# Patient Record
Sex: FEMALE | URBAN - NONMETROPOLITAN AREA
[De-identification: names, ages, dates, MRNs, and addresses within clinical notes are randomized per-mention and may not be internally consistent; named-entity substitution may affect disease eponyms.]

---

## 2018-06-28 ENCOUNTER — APPOINTMENT (RX ONLY)
Dept: URBAN - NONMETROPOLITAN AREA CLINIC 1 | Facility: CLINIC | Age: 83
Setting detail: DERMATOLOGY
End: 2018-06-28

## 2018-06-28 DIAGNOSIS — D22 MELANOCYTIC NEVI: ICD-10-CM

## 2018-06-28 DIAGNOSIS — Z85.828 PERSONAL HISTORY OF OTHER MALIGNANT NEOPLASM OF SKIN: ICD-10-CM

## 2018-06-28 DIAGNOSIS — L82.1 OTHER SEBORRHEIC KERATOSIS: ICD-10-CM

## 2018-06-28 PROBLEM — D22.5 MELANOCYTIC NEVI OF TRUNK: Status: ACTIVE | Noted: 2018-06-28

## 2018-06-28 PROBLEM — D22.71 MELANOCYTIC NEVI OF RIGHT LOWER LIMB, INCLUDING HIP: Status: ACTIVE | Noted: 2018-06-28

## 2018-06-28 PROBLEM — Z92.3 PERSONAL HISTORY OF IRRADIATION: Status: ACTIVE | Noted: 2018-06-28

## 2018-06-28 PROCEDURE — ? COUNSELING

## 2018-06-28 PROCEDURE — ? OBSERVATION

## 2018-06-28 PROCEDURE — 99213 OFFICE O/P EST LOW 20 MIN: CPT

## 2018-06-28 ASSESSMENT — LOCATION ZONE DERM
LOCATION ZONE: TOE
LOCATION ZONE: TRUNK

## 2018-06-28 ASSESSMENT — LOCATION SIMPLE DESCRIPTION DERM
LOCATION SIMPLE: UPPER BACK
LOCATION SIMPLE: CHEST
LOCATION SIMPLE: RIGHT 3RD TOE

## 2018-06-28 ASSESSMENT — LOCATION DETAILED DESCRIPTION DERM
LOCATION DETAILED: MIDDLE STERNUM
LOCATION DETAILED: RIGHT MEDIAL 3RD TOE
LOCATION DETAILED: INFERIOR THORACIC SPINE

## 2018-06-28 NOTE — PROCEDURE: OBSERVATION
Detail Level: Detailed
Morphology Per Location (Optional): even brown macule
X Size Of Lesion In Cm (Optional): 0.2

## 2019-06-23 ENCOUNTER — HOSPITAL ENCOUNTER (OUTPATIENT)
Dept: RADIOLOGY | Facility: MEDICAL CENTER | Age: 84
End: 2019-06-23

## 2019-06-23 ENCOUNTER — APPOINTMENT (OUTPATIENT)
Dept: RADIOLOGY | Facility: MEDICAL CENTER | Age: 84
DRG: 641 | End: 2019-06-23
Attending: EMERGENCY MEDICINE
Payer: MEDICARE

## 2019-06-23 ENCOUNTER — APPOINTMENT (OUTPATIENT)
Dept: RADIOLOGY | Facility: MEDICAL CENTER | Age: 84
DRG: 641 | End: 2019-06-23
Attending: SURGERY
Payer: MEDICARE

## 2019-06-23 ENCOUNTER — HOSPITAL ENCOUNTER (INPATIENT)
Facility: MEDICAL CENTER | Age: 84
LOS: 1 days | DRG: 641 | End: 2019-06-24
Attending: EMERGENCY MEDICINE | Admitting: FAMILY MEDICINE
Payer: MEDICARE

## 2019-06-23 DIAGNOSIS — E87.6 HYPOKALEMIA: ICD-10-CM

## 2019-06-23 DIAGNOSIS — E87.5 HYPERKALEMIA: ICD-10-CM

## 2019-06-23 DIAGNOSIS — K52.0 RADIATION ENTEROCOLITIS: ICD-10-CM

## 2019-06-23 DIAGNOSIS — G89.29 OTHER CHRONIC PAIN: ICD-10-CM

## 2019-06-23 DIAGNOSIS — R19.8 PERFORATED VISCUS: ICD-10-CM

## 2019-06-23 DIAGNOSIS — R10.9 ABDOMINAL PAIN, UNSPECIFIED ABDOMINAL LOCATION: ICD-10-CM

## 2019-06-23 DIAGNOSIS — N17.9 AKI (ACUTE KIDNEY INJURY) (HCC): ICD-10-CM

## 2019-06-23 PROBLEM — E03.9 HYPOTHYROID: Status: ACTIVE | Noted: 2019-06-23

## 2019-06-23 PROBLEM — Z78.9 ON TOTAL PARENTERAL NUTRITION (TPN): Status: ACTIVE | Noted: 2019-06-23

## 2019-06-23 PROBLEM — K66.8 PNEUMOPERITONEUM: Status: ACTIVE | Noted: 2019-06-23

## 2019-06-23 PROBLEM — I10 ESSENTIAL HYPERTENSION: Status: ACTIVE | Noted: 2019-06-23

## 2019-06-23 PROBLEM — D75.89 MACROCYTOSIS: Status: ACTIVE | Noted: 2019-06-23

## 2019-06-23 PROBLEM — R79.89 ELEVATED LACTIC ACID LEVEL: Status: ACTIVE | Noted: 2019-06-23

## 2019-06-23 LAB
ALBUMIN SERPL BCP-MCNC: 3.1 G/DL (ref 3.2–4.9)
ALBUMIN/GLOB SERPL: 1.1 G/DL
ALP SERPL-CCNC: 131 U/L (ref 30–99)
ALT SERPL-CCNC: 29 U/L (ref 2–50)
ANION GAP SERPL CALC-SCNC: 10 MMOL/L (ref 0–11.9)
APPEARANCE UR: ABNORMAL
APTT PPP: 38.7 SEC (ref 24.7–36)
AST SERPL-CCNC: 18 U/L (ref 12–45)
BACTERIA #/AREA URNS HPF: ABNORMAL /HPF
BASOPHILS # BLD AUTO: 0 % (ref 0–1.8)
BASOPHILS # BLD: 0 K/UL (ref 0–0.12)
BILIRUB SERPL-MCNC: 0.4 MG/DL (ref 0.1–1.5)
BILIRUB UR QL STRIP.AUTO: NEGATIVE
BUN SERPL-MCNC: 60 MG/DL (ref 8–22)
CALCIUM SERPL-MCNC: 9.3 MG/DL (ref 8.5–10.5)
CHLORIDE SERPL-SCNC: 103 MMOL/L (ref 96–112)
CK SERPL-CCNC: 20 U/L (ref 0–154)
CO2 SERPL-SCNC: 20 MMOL/L (ref 20–33)
COLOR UR: ABNORMAL
CREAT SERPL-MCNC: 1.29 MG/DL (ref 0.5–1.4)
EKG IMPRESSION: NORMAL
EKG IMPRESSION: NORMAL
EOSINOPHIL # BLD AUTO: 0 K/UL (ref 0–0.51)
EOSINOPHIL NFR BLD: 0 % (ref 0–6.9)
ERYTHROCYTE [DISTWIDTH] IN BLOOD BY AUTOMATED COUNT: 59 FL (ref 35.9–50)
GLOBULIN SER CALC-MCNC: 2.9 G/DL (ref 1.9–3.5)
GLUCOSE SERPL-MCNC: 97 MG/DL (ref 65–99)
GLUCOSE UR STRIP.AUTO-MCNC: NEGATIVE MG/DL
HAV IGM SERPL QL IA: NEGATIVE
HBV CORE IGM SER QL: NEGATIVE
HBV SURFACE AB SERPL IA-ACNC: 3.4 MIU/ML (ref 0–10)
HBV SURFACE AG SER QL: NEGATIVE
HCT VFR BLD AUTO: 37.7 % (ref 37–47)
HCV AB SER QL: NEGATIVE
HGB BLD-MCNC: 11.9 G/DL (ref 12–16)
INR PPP: 1.19 (ref 0.87–1.13)
KETONES UR STRIP.AUTO-MCNC: NEGATIVE MG/DL
LACTATE BLD-SCNC: 2.8 MMOL/L (ref 0.5–2)
LDH SERPL L TO P-CCNC: 115 U/L (ref 107–266)
LEUKOCYTE ESTERASE UR QL STRIP.AUTO: ABNORMAL
LIPASE SERPL-CCNC: 75 U/L (ref 11–82)
LYMPHOCYTES # BLD AUTO: 0.48 K/UL (ref 1–4.8)
LYMPHOCYTES NFR BLD: 5 % (ref 22–41)
MANUAL DIFF BLD: NORMAL
MCH RBC QN AUTO: 31.6 PG (ref 27–33)
MCHC RBC AUTO-ENTMCNC: 31.6 G/DL (ref 33.6–35)
MCV RBC AUTO: 100 FL (ref 81.4–97.8)
MICRO URNS: ABNORMAL
MONOCYTES # BLD AUTO: 0.77 K/UL (ref 0–0.85)
MONOCYTES NFR BLD AUTO: 8 % (ref 0–13.4)
MORPHOLOGY BLD-IMP: NORMAL
NEUTROPHILS # BLD AUTO: 8.35 K/UL (ref 2–7.15)
NEUTROPHILS NFR BLD: 79 % (ref 44–72)
NEUTS BAND NFR BLD MANUAL: 8 % (ref 0–10)
NITRITE UR QL STRIP.AUTO: POSITIVE
NRBC # BLD AUTO: 0 K/UL
NRBC BLD-RTO: 0 /100 WBC
PH UR STRIP.AUTO: 5 [PH]
PLATELET # BLD AUTO: 420 K/UL (ref 164–446)
PMV BLD AUTO: 9.9 FL (ref 9–12.9)
POTASSIUM SERPL-SCNC: 6.4 MMOL/L (ref 3.6–5.5)
POTASSIUM SERPL-SCNC: 7.5 MMOL/L (ref 3.6–5.5)
PROT SERPL-MCNC: 6 G/DL (ref 6–8.2)
PROT UR QL STRIP: NEGATIVE MG/DL
PROTHROMBIN TIME: 15.4 SEC (ref 12–14.6)
RBC # BLD AUTO: 3.77 M/UL (ref 4.2–5.4)
RBC # URNS HPF: ABNORMAL /HPF
RBC UR QL AUTO: ABNORMAL
SODIUM SERPL-SCNC: 133 MMOL/L (ref 135–145)
SP GR UR STRIP.AUTO: 1.02
UROBILINOGEN UR STRIP.AUTO-MCNC: 0.2 MG/DL
WBC # BLD AUTO: 9.6 K/UL (ref 4.8–10.8)
WBC #/AREA URNS HPF: ABNORMAL /HPF

## 2019-06-23 PROCEDURE — 700111 HCHG RX REV CODE 636 W/ 250 OVERRIDE (IP): Performed by: FAMILY MEDICINE

## 2019-06-23 PROCEDURE — 85610 PROTHROMBIN TIME: CPT

## 2019-06-23 PROCEDURE — 96366 THER/PROPH/DIAG IV INF ADDON: CPT

## 2019-06-23 PROCEDURE — 84132 ASSAY OF SERUM POTASSIUM: CPT

## 2019-06-23 PROCEDURE — 700111 HCHG RX REV CODE 636 W/ 250 OVERRIDE (IP): Performed by: EMERGENCY MEDICINE

## 2019-06-23 PROCEDURE — 85007 BL SMEAR W/DIFF WBC COUNT: CPT

## 2019-06-23 PROCEDURE — 99232 SBSQ HOSP IP/OBS MODERATE 35: CPT | Performed by: INTERNAL MEDICINE

## 2019-06-23 PROCEDURE — 36556 INSERT NON-TUNNEL CV CATH: CPT

## 2019-06-23 PROCEDURE — 87086 URINE CULTURE/COLONY COUNT: CPT

## 2019-06-23 PROCEDURE — 700105 HCHG RX REV CODE 258: Performed by: FAMILY MEDICINE

## 2019-06-23 PROCEDURE — 700101 HCHG RX REV CODE 250: Performed by: EMERGENCY MEDICINE

## 2019-06-23 PROCEDURE — 93005 ELECTROCARDIOGRAM TRACING: CPT | Performed by: EMERGENCY MEDICINE

## 2019-06-23 PROCEDURE — 80053 COMPREHEN METABOLIC PANEL: CPT

## 2019-06-23 PROCEDURE — 81001 URINALYSIS AUTO W/SCOPE: CPT

## 2019-06-23 PROCEDURE — 71045 X-RAY EXAM CHEST 1 VIEW: CPT

## 2019-06-23 PROCEDURE — 83690 ASSAY OF LIPASE: CPT

## 2019-06-23 PROCEDURE — 87186 SC STD MICRODIL/AGAR DIL: CPT

## 2019-06-23 PROCEDURE — 87077 CULTURE AEROBIC IDENTIFY: CPT

## 2019-06-23 PROCEDURE — 304538 HCHG NG TUBE

## 2019-06-23 PROCEDURE — 96365 THER/PROPH/DIAG IV INF INIT: CPT

## 2019-06-23 PROCEDURE — 99291 CRITICAL CARE FIRST HOUR: CPT | Performed by: FAMILY MEDICINE

## 2019-06-23 PROCEDURE — 86706 HEP B SURFACE ANTIBODY: CPT

## 2019-06-23 PROCEDURE — 02HV33Z INSERTION OF INFUSION DEVICE INTO SUPERIOR VENA CAVA, PERCUTANEOUS APPROACH: ICD-10-PCS | Performed by: SURGERY

## 2019-06-23 PROCEDURE — 5A1D70Z PERFORMANCE OF URINARY FILTRATION, INTERMITTENT, LESS THAN 6 HOURS PER DAY: ICD-10-PCS | Performed by: INTERNAL MEDICINE

## 2019-06-23 PROCEDURE — 80074 ACUTE HEPATITIS PANEL: CPT

## 2019-06-23 PROCEDURE — 99222 1ST HOSP IP/OBS MODERATE 55: CPT | Performed by: INTERNAL MEDICINE

## 2019-06-23 PROCEDURE — 90935 HEMODIALYSIS ONE EVALUATION: CPT

## 2019-06-23 PROCEDURE — 85027 COMPLETE CBC AUTOMATED: CPT

## 2019-06-23 PROCEDURE — 700105 HCHG RX REV CODE 258: Performed by: EMERGENCY MEDICINE

## 2019-06-23 PROCEDURE — 700111 HCHG RX REV CODE 636 W/ 250 OVERRIDE (IP): Performed by: INTERNAL MEDICINE

## 2019-06-23 PROCEDURE — 99291 CRITICAL CARE FIRST HOUR: CPT

## 2019-06-23 PROCEDURE — C1751 CATH, INF, PER/CENT/MIDLINE: HCPCS

## 2019-06-23 PROCEDURE — 700102 HCHG RX REV CODE 250 W/ 637 OVERRIDE(OP): Performed by: EMERGENCY MEDICINE

## 2019-06-23 PROCEDURE — 94640 AIRWAY INHALATION TREATMENT: CPT

## 2019-06-23 PROCEDURE — C1751 CATH, INF, PER/CENT/MIDLINE: HCPCS | Performed by: SURGERY

## 2019-06-23 PROCEDURE — 36415 COLL VENOUS BLD VENIPUNCTURE: CPT

## 2019-06-23 PROCEDURE — 82550 ASSAY OF CK (CPK): CPT

## 2019-06-23 PROCEDURE — 85730 THROMBOPLASTIN TIME PARTIAL: CPT

## 2019-06-23 PROCEDURE — 770004 HCHG ROOM/CARE - ONCOLOGY PRIVATE *

## 2019-06-23 PROCEDURE — 83615 LACTATE (LD) (LDH) ENZYME: CPT

## 2019-06-23 PROCEDURE — 96375 TX/PRO/DX INJ NEW DRUG ADDON: CPT

## 2019-06-23 PROCEDURE — 83605 ASSAY OF LACTIC ACID: CPT

## 2019-06-23 RX ORDER — SODIUM CHLORIDE 9 MG/ML
1000 INJECTION, SOLUTION INTRAVENOUS
Status: DISCONTINUED | OUTPATIENT
Start: 2019-06-23 | End: 2019-06-23

## 2019-06-23 RX ORDER — SODIUM CHLORIDE 9 MG/ML
30 INJECTION, SOLUTION INTRAVENOUS
Status: DISCONTINUED | OUTPATIENT
Start: 2019-06-23 | End: 2019-06-23

## 2019-06-23 RX ORDER — HYDROMORPHONE HYDROCHLORIDE 1 MG/ML
0.5 INJECTION, SOLUTION INTRAMUSCULAR; INTRAVENOUS; SUBCUTANEOUS ONCE
Status: COMPLETED | OUTPATIENT
Start: 2019-06-23 | End: 2019-06-23

## 2019-06-23 RX ORDER — SODIUM POLYSTYRENE SULFONATE 15 G/60ML
30 SUSPENSION ORAL; RECTAL ONCE
Status: DISCONTINUED | OUTPATIENT
Start: 2019-06-23 | End: 2019-06-23

## 2019-06-23 RX ORDER — BUPRENORPHINE AND NALOXONE 2; .5 MG/1; MG/1
1 FILM, SOLUBLE BUCCAL; SUBLINGUAL
COMMUNITY
Start: 2018-04-19

## 2019-06-23 RX ORDER — POLYETHYLENE GLYCOL 3350 17 G/17G
1 POWDER, FOR SOLUTION ORAL
Status: DISCONTINUED | OUTPATIENT
Start: 2019-06-23 | End: 2019-06-23

## 2019-06-23 RX ORDER — DIPHENOXYLATE HYDROCHLORIDE AND ATROPINE SULFATE 2.5; .025 MG/1; MG/1
0.5 TABLET ORAL 4 TIMES DAILY PRN
COMMUNITY
Start: 2014-11-25

## 2019-06-23 RX ORDER — SODIUM CHLORIDE 9 MG/ML
250 INJECTION, SOLUTION INTRAVENOUS
Status: DISCONTINUED | OUTPATIENT
Start: 2019-06-23 | End: 2019-06-23

## 2019-06-23 RX ORDER — HYDROCODONE BITARTRATE AND ACETAMINOPHEN 5; 325 MG/1; MG/1
1 TABLET ORAL EVERY 6 HOURS PRN
COMMUNITY
Start: 2017-09-25

## 2019-06-23 RX ORDER — HYDROMORPHONE HYDROCHLORIDE 1 MG/ML
0.5 INJECTION, SOLUTION INTRAMUSCULAR; INTRAVENOUS; SUBCUTANEOUS
Status: DISCONTINUED | OUTPATIENT
Start: 2019-06-23 | End: 2019-06-23

## 2019-06-23 RX ORDER — CIPROFLOXACIN 500 MG/1
250 TABLET, FILM COATED ORAL
Status: DISCONTINUED | OUTPATIENT
Start: 2019-06-23 | End: 2019-06-24 | Stop reason: HOSPADM

## 2019-06-23 RX ORDER — SPIRONOLACTONE 25 MG/1
25 TABLET ORAL DAILY
COMMUNITY
Start: 2018-04-19

## 2019-06-23 RX ORDER — LABETALOL HYDROCHLORIDE 5 MG/ML
10 INJECTION, SOLUTION INTRAVENOUS EVERY 4 HOURS PRN
Status: DISCONTINUED | OUTPATIENT
Start: 2019-06-23 | End: 2019-06-23

## 2019-06-23 RX ORDER — ALBUMIN (HUMAN) 12.5 G/50ML
12.5 SOLUTION INTRAVENOUS
Status: DISCONTINUED | OUTPATIENT
Start: 2019-06-23 | End: 2019-06-23

## 2019-06-23 RX ORDER — ONDANSETRON 2 MG/ML
8 INJECTION INTRAMUSCULAR; INTRAVENOUS EVERY 8 HOURS PRN
Status: DISCONTINUED | OUTPATIENT
Start: 2019-06-23 | End: 2019-06-24 | Stop reason: HOSPADM

## 2019-06-23 RX ORDER — SODIUM CHLORIDE 9 MG/ML
1000 INJECTION, SOLUTION INTRAVENOUS ONCE
Status: COMPLETED | OUTPATIENT
Start: 2019-06-23 | End: 2019-06-23

## 2019-06-23 RX ORDER — BISACODYL 10 MG
10 SUPPOSITORY, RECTAL RECTAL
Status: DISCONTINUED | OUTPATIENT
Start: 2019-06-23 | End: 2019-06-23

## 2019-06-23 RX ORDER — LORAZEPAM 2 MG/ML
1 CONCENTRATE ORAL
Status: DISCONTINUED | OUTPATIENT
Start: 2019-06-23 | End: 2019-06-24 | Stop reason: HOSPADM

## 2019-06-23 RX ORDER — LORAZEPAM 2 MG/ML
1 INJECTION INTRAMUSCULAR
Status: DISCONTINUED | OUTPATIENT
Start: 2019-06-23 | End: 2019-06-24 | Stop reason: HOSPADM

## 2019-06-23 RX ORDER — SODIUM CHLORIDE, SODIUM LACTATE, POTASSIUM CHLORIDE, AND CALCIUM CHLORIDE .6; .31; .03; .02 G/100ML; G/100ML; G/100ML; G/100ML
1000 INJECTION, SOLUTION INTRAVENOUS ONCE
Status: DISCONTINUED | OUTPATIENT
Start: 2019-06-23 | End: 2019-06-23

## 2019-06-23 RX ORDER — ONDANSETRON 2 MG/ML
4 INJECTION INTRAMUSCULAR; INTRAVENOUS ONCE
Status: COMPLETED | OUTPATIENT
Start: 2019-06-23 | End: 2019-06-23

## 2019-06-23 RX ORDER — LOPERAMIDE HYDROCHLORIDE 2 MG/1
0.5 TABLET ORAL 4 TIMES DAILY PRN
Status: ON HOLD | COMMUNITY
Start: 2012-05-17 | End: 2019-06-24

## 2019-06-23 RX ORDER — ACETAMINOPHEN 325 MG/1
650 TABLET ORAL EVERY 6 HOURS PRN
Status: DISCONTINUED | OUTPATIENT
Start: 2019-06-23 | End: 2019-06-23

## 2019-06-23 RX ORDER — MORPHINE SULFATE 10 MG/ML
5 INJECTION, SOLUTION INTRAMUSCULAR; INTRAVENOUS
Status: DISCONTINUED | OUTPATIENT
Start: 2019-06-23 | End: 2019-06-24 | Stop reason: HOSPADM

## 2019-06-23 RX ORDER — ONDANSETRON 4 MG/1
4 TABLET, ORALLY DISINTEGRATING ORAL EVERY 4 HOURS PRN
Status: DISCONTINUED | OUTPATIENT
Start: 2019-06-23 | End: 2019-06-23

## 2019-06-23 RX ORDER — HEPARIN SODIUM 1000 [USP'U]/ML
3000 INJECTION, SOLUTION INTRAVENOUS; SUBCUTANEOUS
Status: DISCONTINUED | OUTPATIENT
Start: 2019-06-23 | End: 2019-06-23

## 2019-06-23 RX ORDER — LEVOTHYROXINE SODIUM 0.1 MG/1
100 TABLET ORAL DAILY
COMMUNITY
Start: 2010-11-12

## 2019-06-23 RX ORDER — ATROPINE SULFATE 10 MG/ML
2 SOLUTION/ DROPS OPHTHALMIC EVERY 4 HOURS PRN
Status: DISCONTINUED | OUTPATIENT
Start: 2019-06-23 | End: 2019-06-24 | Stop reason: HOSPADM

## 2019-06-23 RX ORDER — LOSARTAN POTASSIUM 25 MG/1
25 TABLET ORAL DAILY
Status: ON HOLD | COMMUNITY
Start: 2018-11-13 | End: 2019-06-24

## 2019-06-23 RX ORDER — OXYCODONE HYDROCHLORIDE 5 MG/1
5 TABLET ORAL
Status: DISCONTINUED | OUTPATIENT
Start: 2019-06-23 | End: 2019-06-23

## 2019-06-23 RX ORDER — ONDANSETRON 2 MG/ML
4 INJECTION INTRAMUSCULAR; INTRAVENOUS EVERY 4 HOURS PRN
Status: DISCONTINUED | OUTPATIENT
Start: 2019-06-23 | End: 2019-06-23

## 2019-06-23 RX ORDER — AMOXICILLIN 250 MG
2 CAPSULE ORAL 2 TIMES DAILY
Status: DISCONTINUED | OUTPATIENT
Start: 2019-06-23 | End: 2019-06-23

## 2019-06-23 RX ORDER — ONDANSETRON 8 MG/1
8 TABLET, ORALLY DISINTEGRATING ORAL EVERY 8 HOURS PRN
Status: DISCONTINUED | OUTPATIENT
Start: 2019-06-23 | End: 2019-06-24 | Stop reason: HOSPADM

## 2019-06-23 RX ORDER — OXYCODONE HYDROCHLORIDE 10 MG/1
10 TABLET ORAL
Status: DISCONTINUED | OUTPATIENT
Start: 2019-06-23 | End: 2019-06-23

## 2019-06-23 RX ORDER — MORPHINE SULFATE 10 MG/ML
10 INJECTION, SOLUTION INTRAMUSCULAR; INTRAVENOUS
Status: DISCONTINUED | OUTPATIENT
Start: 2019-06-23 | End: 2019-06-24 | Stop reason: HOSPADM

## 2019-06-23 RX ORDER — SODIUM CHLORIDE 9 MG/ML
INJECTION, SOLUTION INTRAVENOUS CONTINUOUS
Status: DISCONTINUED | OUTPATIENT
Start: 2019-06-23 | End: 2019-06-23

## 2019-06-23 RX ORDER — HEPARIN SODIUM 5000 [USP'U]/ML
5000 INJECTION, SOLUTION INTRAVENOUS; SUBCUTANEOUS EVERY 8 HOURS
Status: DISCONTINUED | OUTPATIENT
Start: 2019-06-23 | End: 2019-06-23

## 2019-06-23 RX ADMIN — HYDROMORPHONE HYDROCHLORIDE 0.5 MG: 1 INJECTION, SOLUTION INTRAMUSCULAR; INTRAVENOUS; SUBCUTANEOUS at 13:21

## 2019-06-23 RX ADMIN — MORPHINE SULFATE 5 MG: 10 INJECTION INTRAVENOUS at 17:56

## 2019-06-23 RX ADMIN — ALBUTEROL SULFATE 10 MG: 5 SOLUTION RESPIRATORY (INHALATION) at 07:47

## 2019-06-23 RX ADMIN — SODIUM CHLORIDE: 9 INJECTION, SOLUTION INTRAVENOUS at 12:12

## 2019-06-23 RX ADMIN — DEXTROSE MONOHYDRATE 250 ML: 100 INJECTION, SOLUTION INTRAVENOUS at 08:18

## 2019-06-23 RX ADMIN — HYDROMORPHONE HYDROCHLORIDE 0.25 MG: 1 INJECTION, SOLUTION INTRAMUSCULAR; INTRAVENOUS; SUBCUTANEOUS at 08:36

## 2019-06-23 RX ADMIN — ONDANSETRON 4 MG: 2 INJECTION INTRAMUSCULAR; INTRAVENOUS at 07:38

## 2019-06-23 RX ADMIN — MORPHINE SULFATE 5 MG: 10 INJECTION INTRAVENOUS at 20:48

## 2019-06-23 RX ADMIN — SODIUM CHLORIDE 1000 ML: 9 INJECTION, SOLUTION INTRAVENOUS at 09:32

## 2019-06-23 RX ADMIN — SODIUM BICARBONATE 50 MEQ: 84 INJECTION, SOLUTION INTRAVENOUS at 09:34

## 2019-06-23 RX ADMIN — INSULIN HUMAN 10 UNITS: 100 INJECTION, SOLUTION PARENTERAL at 08:25

## 2019-06-23 RX ADMIN — MORPHINE SULFATE 5 MG: 10 INJECTION INTRAVENOUS at 18:44

## 2019-06-23 RX ADMIN — MORPHINE SULFATE 5 MG: 10 INJECTION INTRAVENOUS at 15:54

## 2019-06-23 RX ADMIN — CALCIUM GLUCONATE 1000 MG: 98 INJECTION, SOLUTION INTRAVENOUS at 09:04

## 2019-06-23 ASSESSMENT — COGNITIVE AND FUNCTIONAL STATUS - GENERAL
TURNING FROM BACK TO SIDE WHILE IN FLAT BAD: A LITTLE
HELP NEEDED FOR BATHING: A LITTLE
PERSONAL GROOMING: A LITTLE
CLIMB 3 TO 5 STEPS WITH RAILING: TOTAL
WALKING IN HOSPITAL ROOM: A LOT
SUGGESTED CMS G CODE MODIFIER DAILY ACTIVITY: CK
STANDING UP FROM CHAIR USING ARMS: A LOT
MOVING FROM LYING ON BACK TO SITTING ON SIDE OF FLAT BED: A LOT
DAILY ACTIVITIY SCORE: 18
DRESSING REGULAR UPPER BODY CLOTHING: A LITTLE
TOILETING: A LITTLE
MOBILITY SCORE: 13
MOVING TO AND FROM BED TO CHAIR: A LITTLE
DRESSING REGULAR LOWER BODY CLOTHING: A LOT
SUGGESTED CMS G CODE MODIFIER MOBILITY: CL

## 2019-06-23 ASSESSMENT — ENCOUNTER SYMPTOMS
SORE THROAT: 0
NERVOUS/ANXIOUS: 0
BLOOD IN STOOL: 0
BLURRED VISION: 0
HEADACHES: 0
DIARRHEA: 0
NAUSEA: 1
NAUSEA: 0
WEAKNESS: 0
FEVER: 0
SHORTNESS OF BREATH: 0
COUGH: 0
BACK PAIN: 0
NECK PAIN: 0
VOMITING: 1
CHILLS: 0
ABDOMINAL PAIN: 1
WHEEZING: 0
DIZZINESS: 0
HEARTBURN: 0

## 2019-06-23 ASSESSMENT — PATIENT HEALTH QUESTIONNAIRE - PHQ9
SUM OF ALL RESPONSES TO PHQ9 QUESTIONS 1 AND 2: 0
1. LITTLE INTEREST OR PLEASURE IN DOING THINGS: NOT AT ALL
2. FEELING DOWN, DEPRESSED, IRRITABLE, OR HOPELESS: NOT AT ALL

## 2019-06-23 ASSESSMENT — LIFESTYLE VARIABLES
DO YOU DRINK ALCOHOL: NO
EVER_SMOKED: NEVER

## 2019-06-23 ASSESSMENT — PAIN DESCRIPTION - DESCRIPTORS: DESCRIPTORS: ACHING

## 2019-06-23 NOTE — ED NOTES
Report received from RN. Assumed care of pt. ED tech completed EKG. Pt denies needs at this time. Call light within reach.

## 2019-06-23 NOTE — ED PROVIDER NOTES
ED Provider Note    Scribed for Fidel Gaona M.D. by Ayad Prajapati. 6/23/2019, 7:23 AM.    Primary care provider: No primary care provider noted.  Means of arrival: Ambulance  History obtained from: Patient  History limited by: None     CHIEF COMPLAINT  Chief Complaint   Patient presents with   • Abnormal Labs   • Abdominal Pain       HPI  Alicia Diaz is a 84 y.o. female who presents to the Emergency Department via ambulance as a transfer from [facility] for further evaluation of gradual worsening abdominal pain onset yesterday. She initially presented at [facility] for similar symptoms at 1:30 AM, where she underwent lab and imaging testing. She was then recommended to come to the [facility] ED for further evaluation after elevated potassium and free air in CT abdomen. Her abdominal pain is reported as worse than her normal enterocolitis and believes that she may have recurrent bowel obstruction. She endorses associated 3 episodes of vomiting this morning and nausea. She is on TPN 24 hours per day and has been on this intermittently for the past 3 months. She does not have BMs as she is on complete bowel rest. She was recently hospitalized for diarrhea and colitis with electrolyte abnormality. She has a history of 4 bowel obstructions with perforation, radiation of enteritis, proctitis and enterocolitis. She also has a history of endometrial cancer 21 years ago and had radiation treatment. Denies chest pain, shortness of breath, passing gas, cough, fever, chills, or dysuria.    REVIEW OF SYSTEMS  Review of Systems   Constitutional: Negative for chills and fever.   Respiratory: Negative for cough and shortness of breath.    Cardiovascular: Negative for chest pain.   Gastrointestinal: Positive for abdominal pain, nausea and vomiting.        Negative for passing gas   Genitourinary: Negative for dysuria.   All other systems reviewed and are negative.      PAST MEDICAL HISTORY   has a past medical history of Cancer (HCC);  "Chronic pain; Dysphagia; Gastric ulcer with perforation (HCC); HTN (hypertension); Hypothyroid; Pancreatic insufficiency; Radiation enterocolitis; and SBO (small bowel obstruction) (HCC). Radiation, colitis, proctitis, small bowel obstruction, chronic diarrhea, gastric ulcer and perforation in 2015. Pancreatic insufficiency malabsorption syndrome. Hysterectomy, cholecystectomy, hypokalemia, renal failure.    SURGICAL HISTORY   has a past surgical history that includes hysterectomy radical and cholecystectomy.    SOCIAL HISTORY  Social History   Substance Use Topics      History   Drug Use   • Types: Marijuana       FAMILY HISTORY  None noted     CURRENT MEDICATIONS  No current facility-administered medications on file prior to encounter.      No current outpatient prescriptions on file prior to encounter.      ALLERGIES  No Known Allergies    PHYSICAL EXAM  VITAL SIGNS: /45   Pulse (!) 106   Temp 37.5 °C (99.5 °F) (Temporal)   Resp 14   Ht 1.499 m (4' 11\")   Wt 41.7 kg (92 lb)   BMI 18.58 kg/m²     Constitutional: Cachetic, Moderate distress  HENT:  Dry mucous membranes  Eyes:  No conjunctivitis or icterus  Neck:  trachea is midline, no palpable thyroid  Lymphatic:  No cervical lymphadenopathy  Cardiovascular:  Regular rate and rhythm, no murmurs  Thorax & Lungs:  Normal breath sounds, no rhonchi  Abdomen: Diffusely tender, Soft  Skin:. Warm, dry, no rash  Back:  Non-tender, left sided CVA tenderness  Extremities:   no edema  Vascular:  symmetric radial pulse  Neurologic:  Normal gross motor      LABS  Results for orders placed or performed during the hospital encounter of 06/23/19   CBC WITH DIFFERENTIAL   Result Value Ref Range    WBC 9.6 4.8 - 10.8 K/uL    RBC 3.77 (L) 4.20 - 5.40 M/uL    Hemoglobin 11.9 (L) 12.0 - 16.0 g/dL    Hematocrit 37.7 37.0 - 47.0 %    .0 (H) 81.4 - 97.8 fL    MCH 31.6 27.0 - 33.0 pg    MCHC 31.6 (L) 33.6 - 35.0 g/dL    RDW 59.0 (H) 35.9 - 50.0 fL    Platelet Count 420 " 164 - 446 K/uL    MPV 9.9 9.0 - 12.9 fL    Neutrophils-Polys 79.00 (H) 44.00 - 72.00 %    Lymphocytes 5.00 (L) 22.00 - 41.00 %    Monocytes 8.00 0.00 - 13.40 %    Eosinophils 0.00 0.00 - 6.90 %    Basophils 0.00 0.00 - 1.80 %    Nucleated RBC 0.00 /100 WBC    Neutrophils (Absolute) 8.35 (H) 2.00 - 7.15 K/uL    Lymphs (Absolute) 0.48 (L) 1.00 - 4.80 K/uL    Monos (Absolute) 0.77 0.00 - 0.85 K/uL    Eos (Absolute) 0.00 0.00 - 0.51 K/uL    Baso (Absolute) 0.00 0.00 - 0.12 K/uL    NRBC (Absolute) 0.00 K/uL   COMP METABOLIC PANEL   Result Value Ref Range    Sodium 133 (L) 135 - 145 mmol/L    Potassium 7.5 (HH) 3.6 - 5.5 mmol/L    Chloride 103 96 - 112 mmol/L    Co2 20 20 - 33 mmol/L    Anion Gap 10.0 0.0 - 11.9    Glucose 97 65 - 99 mg/dL    Bun 60 (H) 8 - 22 mg/dL    Creatinine 1.29 0.50 - 1.40 mg/dL    Calcium 9.3 8.5 - 10.5 mg/dL    AST(SGOT) 18 12 - 45 U/L    ALT(SGPT) 29 2 - 50 U/L    Alkaline Phosphatase 131 (H) 30 - 99 U/L    Total Bilirubin 0.4 0.1 - 1.5 mg/dL    Albumin 3.1 (L) 3.2 - 4.9 g/dL    Total Protein 6.0 6.0 - 8.2 g/dL    Globulin 2.9 1.9 - 3.5 g/dL    A-G Ratio 1.1 g/dL   LIPASE   Result Value Ref Range    Lipase 75 11 - 82 U/L   ESTIMATED GFR   Result Value Ref Range    GFR If  48 (A) >60 mL/min/1.73 m 2    GFR If Non  39 (A) >60 mL/min/1.73 m 2   DIFFERENTIAL MANUAL   Result Value Ref Range    Bands-Stabs 8.00 0.00 - 10.00 %    Manual Diff Status PERFORMED    PERIPHERAL SMEAR REVIEW   Result Value Ref Range    Peripheral Smear Review see below    CREATINE KINASE   Result Value Ref Range    CPK Total 20 0 - 154 U/L   LDH   Result Value Ref Range    LDH Total 115 107 - 266 U/L   Hepatitis Panel Acute (4 components)   Result Value Ref Range    Hepatitis B Surface Antigen Negative Negative    Hepatitis B Cors Ab,IgM Negative Negative    Hepatitis A Virus Ab, IgM Negative Negative    Hepatitis C Antibody Negative Negative   Hep B Surface AB   Result Value Ref Range    Hep B  Surface Antibody Quant 3.40 0.00 - 10.00 mIU/mL   Prothrombin time (INR)   Result Value Ref Range    PT 15.4 (H) 12.0 - 14.6 sec    INR 1.19 (H) 0.87 - 1.13   APTT   Result Value Ref Range    APTT 38.7 (H) 24.7 - 36.0 sec   Lactic Acid -STAT Once   Result Value Ref Range    Lactic Acid 2.8 (H) 0.5 - 2.0 mmol/L   Potassium serum (K)   Result Value Ref Range    Potassium 6.4 (H) 3.6 - 5.5 mmol/L   EKG   Result Value Ref Range    Report       [facility] Emergency Dept.    Test Date:  2019  Pt Name:    BO TROTTER                Department: ER  MRN:        [id number]                      Room:        16  Gender:     Female                       Technician: [id number]  :        [id number]                   Requested By:ER TRIAGE PROTOCOL  Order #:    [id number]                    Reading MD:    Measurements  Intervals                                Axis  Rate:       104                          P:          -48  MD:         168                          QRS:        39  QRSD:       78                           T:          59  QT:         308  QTc:        405    Interpretive Statements  SINUS OR ECTOPIC ATRIAL TACHYCARDIA  PROBABLE LEFT VENTRICULAR HYPERTROPHY  ANTERIOR Q WAVES, POSSIBLY DUE TO LVH  BASELINE WANDER IN LEAD(S) V2  No previous ECG available for comparison      All labs reviewed by me.    EKG Interpretation  Interpreted by me   Normal Sinus Rhythm. Rate 104  Peaked T waves in mid anterior leads  Normal corrected QTc  Normal QRS  Normal [place]    RADIOLOGY  DX-CHEST-PORTABLE (1 VIEW)   Final Result      1.  Placement of right internal jugular catheter with tip projecting appropriately at the cavoatrial junction      2.  Placement of enteric catheter tip projecting of the proximal stomach      3.  No other changes         DX-CHEST-PORTABLE (1 VIEW)   Final Result      No acute cardiopulmonary abnormality identified.      OUTSIDE IMAGES-CT ABDOMEN /PELVIS   Final Result        The  radiologist's interpretation of all radiological studies have been reviewed by me.    COURSE & MEDICAL DECISION MAKING  Pertinent Labs & Imaging studies reviewed. (See chart for details)    Transfer labs show: CMP - Na: 131, K: 7.2, CO2: 22, BUN: 56, creatinine 1.28, Alk phos: 152. UA shows >100 wbcs, 4+ bacteria. Lactate normal, lipase normal, magnesium normal. Hemo 12.2, .6. Normal WBC. At transferring facility she was given normal saline, antinausea, pepcid, calcium gluconate, insulin and ceftriaxone. She is a DNR. CT shows free air and areas of thickened small bowel.    7:23 AM - Patient seen and examined at bedside. Patient will be treated with calcium gluconate 1,000 mg, dextrose 10% bolus, insulin regular 10 units, sodium bicarbonate, albuterol 2.5mg/0.5 ml, sodium polystyrene 15Gm/60 ml and sodium polystyrene 15 GM/60 ml. Ordered chest x-ray, CBC, CMP, lipase, urinalysis, and EKG to evaluate her symptoms. The differential diagnoses include but are not limited to: UTI, hyperkalemia, bowel obstruction. Will repeat her lab work and treat her for hyperkalemia. She has already received ceftriaxone for UTI. Will consult with surgeon.     8:04 AM Surgery paged.    8:05 AM Potassium is 7.5.     8:10 AM I discussed the patient's case and the above findings with Dr. Mann (Surgery) who will consult the patient. NG tube will be placed.     8:20 AM Paged Hospitalist.      8:26 AM I discussed the patient's case and the above findings with Dr. Leblanc (Hospitalist) who agrees to admit the patient.     8:32 AM On recheck, patient's daughter is on speaker phone. Discussed NG tube placement with patient who agrees to this. Informed her that Dr. Mann will come and see her and Dr. Leblanc will admit. Patient is currently complaining of pain, ordered dilaudid 0.5 mg for her symptoms.    8:49 AM Paged nephrology.      9:03 AM I discussed the patient's case and the above findings with Dr. Contreras (Nephrology) who is  recommending dialysis for persistent.    9:43 AM Paged Pulmonary.     9:56 AM I discussed the patient's case and the above findings with Dr. Tsai (Pulmonary) who will consult the patient      CRITICAL CARE  The very real possibilty of a deterioration of this patient's condition required the highest level of my preparedness for sudden, emergent intervention.  I provided critical care services, which included medication orders, frequent reevaluations of the patient's condition and response to treatment, ordering and reviewing test results, and discussing the case with various consultants.  The critical care time associated with the care of the patient was 30 minutes. Review chart for interventions. This time is exclusive of any other billable procedures.      Medical Decision Making:   Patient is been seen by surgery and she has been placed.  She will be observed.  I have given her medical treatment for hyperkalemia.  She will have dialysis for definitive treatment.    DISPOSITION:  Patient will be admitted to Dr. Leblanc in critical condition.     FINAL IMPRESSION  1. Hypokalemia    2. Perforated viscus    3. Abdominal pain, unspecified abdominal location       Critical care time of 30 minutes.      Ayad FATIMA (Pillo), am scribing for, and in the presence of, Fidel Gaona M.D..    Electronically signed by: Ayad Prajapati (Pillo), 6/23/2019    Fidel FATIMA M.D. personally performed the services described in this documentation, as scribed by Ayad Prajapati in my presence, and it is both accurate and complete. C.     The note accurately reflects work and decisions made by me.  Fidel Gaona  6/23/2019  1:01 PM

## 2019-06-23 NOTE — CONSULTS
Nephrology Consultation    Date of Service  6/23/2019    Referring Physician  Dr. Leonardo Leblanc    Consulting Physician  Nelson Contreras M.D.    Reason for Consultation  Hyperkalemia, DARI    History of Presenting Illness  84 y.o. 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     She said the pain suddenly got worse on top of her chronic pain.  She decided to present to the hospital, where imaging was done that showed thickening of the small bowel, as well as free air, concerning for some kind of perforation.  Her potassium was also noted to be elevated at 7.2.  The patient was transferred to [facility], where her repeat potassium was 7.5.  The patient denies any known history of kidney disease.  She denies taking any NSAIDs recently.  She denies trouble urinating recently.  The patient is amenable to temporary urgent dialysis for hyperkalemia.    Of note, after my evaluation, the patient was evaluated by critical care, and decided to pursue comfort care measures.  The patient was on dialysis when this conversation took place, and the patient elected to continue and finish out the treatment of dialysis.    Review of Systems  Review of Systems   Constitutional: Negative for fever.   Respiratory: Negative for shortness of breath.    Cardiovascular: Negative for chest pain.   Gastrointestinal: Positive for abdominal pain.   All other systems reviewed and are negative.      Past Medical History  Past Medical History:   Diagnosis Date   • Cancer (HCC)     Uterina CA w/ hysterectomy + radiation    • Chronic pain    • Dysphagia    • Gastric ulcer with perforation (HCC)    • HTN (hypertension)    • Hypothyroid    • Pancreatic insufficiency    • Radiation enterocolitis    • SBO (small bowel obstruction) (HCC)        Surgical History  Past Surgical History:   Procedure Laterality Date   •  CHOLECYSTECTOMY     • HYSTERECTOMY RADICAL         Family History  Family History   Problem Relation Age of Onset   • Heart Failure Mother    • Diabetes Brother        Social History  Social History     Social History   • Marital status:      Spouse name: N/A   • Number of children: N/A   • Years of education: N/A     Occupational History   • Not on file.     Social History Main Topics   • Smoking status: Former Smoker   • Smokeless tobacco: Not on file   • Alcohol use No   • Drug use: Yes     Types: Marijuana   • Sexual activity: Not on file     Other Topics Concern   • Not on file     Social History Narrative   • No narrative on file       Medications  Current Facility-Administered Medications   Medication Dose Route Frequency Provider Last Rate Last Dose   • sodium polystyrene (KAYEXALATE) 15 GM/60ML suspension 30 g  30 g Oral Once Fidel Gaona M.D.        Or   • sodium polystyrene (KAYEXALATE) 15 GM/60ML suspension 30 g  30 g Rectal Once Fidel Gaona M.D.       • NS (BOLUS) infusion 250 mL  250 mL Intravenous DIALYSIS PRN Nelson Contreras M.D.       • albumin human 25% solution 12.5 g  12.5 g Intravenous DIALYSIS PRN Nelson Contreras M.D.       • NS infusion 1,251 mL  30 mL/kg Intravenous Once PRN Leonardo Leblanc M.D.       • senna-docusate (PERICOLACE or SENOKOT S) 8.6-50 MG per tablet 2 Tab  2 Tab Oral BID Leonardo Leblanc M.D.        And   • polyethylene glycol/lytes (MIRALAX) PACKET 1 Packet  1 Packet Oral QDAY PRN Leonardo Leblanc M.D.        And   • magnesium hydroxide (MILK OF MAGNESIA) suspension 30 mL  30 mL Oral QDAY PRN Leonardo Leblanc M.D.        And   • bisacodyl (DULCOLAX) suppository 10 mg  10 mg Rectal QDAY PRN Leonardo Leblanc M.D.       • NS infusion   Intravenous Continuous Leonardo Leblanc M.D. 100 mL/hr at 06/23/19 1212     • NS (BOLUS) infusion 1,000 mL  1,000 mL Intravenous Once PRN Leonardo Leblanc M.D.       • heparin injection 5,000 Units  5,000 Units Subcutaneous Q8HRS  Leonardo Leblanc M.D.       • acetaminophen (TYLENOL) tablet 650 mg  650 mg Oral Q6HRS PRN Leonardo Leblanc M.D.       • Pharmacy Consult Request ...Pain Management Review 1 Each  1 Each Other PHARMACY TO DOSE Leonardo Leblanc M.D.        And   • oxyCODONE immediate-release (ROXICODONE) tablet 5 mg  5 mg Oral Q3HRS PRN Leonardo Leblanc M.D.        And   • oxyCODONE immediate-release (ROXICODONE) tablet 10 mg  10 mg Oral Q3HRS PRN Leonardo Leblanc M.D.        And   • HYDROmorphone pf (DILAUDID) injection 0.5 mg  0.5 mg Intravenous Q3HRS PRN Leonardo Leblanc M.D.   0.5 mg at 06/23/19 1321   • labetalol (NORMODYNE,TRANDATE) injection 10 mg  10 mg Intravenous Q4HRS PRN Leonardo Leblanc M.D.       • heparin injection 3,000 Units  3,000 Units Intracatheter DIALYSIS PRN Nelson Contreras M.D.       • lactated ringer BOLUS infusion  1,000 mL Intravenous Once Hernán Tsai M.D.       • MD ALERT...adult comfort care   Other PRN Hernán Tsai M.D.       • atropine 1 % ophthalmic solution 2 Drop  2 Drop Sublingual Q4HRS PRN Hernán Tsai M.D.       • morphine (pf) 10 mg/mL injection 5 mg  5 mg Intravenous Q HOUR PRN Hernán Tsia M.D.       • morphine (pf) 10 mg/mL injection 10 mg  10 mg Intravenous Q HOUR PRN Hernán Tsai M.D.       • ondansetron (ZOFRAN ODT) dispertab 8 mg  8 mg Oral Q8HRS PRN Hernán Tsai M.D.        Or   • ondansetron (ZOFRAN) syringe/vial injection 8 mg  8 mg Intravenous Q8HRS PRN Hernán Tsai M.D.       • LORazepam (ATIVAN) 2 MG/ML oral conc 1 mg  1 mg Sublingual Q HOUR PRN Hernán Tsai M.D.        Or   • LORazepam (ATIVAN) injection 1 mg  1 mg Intravenous Q HOUR PRN Hernán Tsai M.D.           Allergies  No Known Allergies    Physical Exam  Temp:  [37.5 °C (99.5 °F)] 37.5 °C (99.5 °F)  Pulse:  [] 106  Resp:  [13-29] 15  BP: (141)/(45) 141/45  SpO2:  [93 %-99 %] 96 %    Physical Exam   Constitutional: She is oriented to person, place, and time. She appears  cachectic. She is cooperative. She appears ill. No distress.   HENT:   Mouth/Throat: No oropharyngeal exudate.   Dry mucous membranes.  NG tube in place.   Eyes: EOM are normal. No scleral icterus.   Neck: No tracheal deviation present.   Cardiovascular: Normal heart sounds.    No murmur heard.  Pulmonary/Chest: Effort normal. No stridor. She has no rales.   Abdominal: Soft. There is tenderness.   Musculoskeletal: Normal range of motion. She exhibits edema (1+ LE).   Neurological: She is alert and oriented to person, place, and time.   Skin: Skin is warm and dry.   Psychiatric: She has a normal mood and affect.   Access: Right IJ temporary Vas-Cath      Fluids  Date 06/23/19 0700 - 06/24/19 0659   Shift 3349-2354 9967-0147 4372-3801 24 Hour Total   I  N  T  A  K  E   I.V. 1000   1000    IV Piggyback 100   100    Shift Total 1100   1100   O  U  T  P  U  T   Shift Total       Weight (kg) 41.7 41.7 41.7 41.7         Laboratory  Labs reviewed, pertinent labs below.  Recent Labs      06/23/19   0713   WBC  9.6   RBC  3.77*   HEMOGLOBIN  11.9*   HEMATOCRIT  37.7   MCV  100.0*   MCH  31.6   MCHC  31.6*   RDW  59.0*   PLATELETCT  420   MPV  9.9     Recent Labs      06/23/19   0713  06/23/19   0944   SODIUM  133*   --    POTASSIUM  7.5*  6.4*   CHLORIDE  103   --    CO2  20   --    GLUCOSE  97   --    BUN  60*   --    CREATININE  1.29   --    CALCIUM  9.3   --      Recent Labs      06/23/19   0944   APTT  38.7*   INR  1.19*            URINALYSIS:    Lab Results  Component Value Date/Time   COLORURINE DK Yellow 06/23/2019 1218   CLARITY Turbid (A) 06/23/2019 1218   SPECGRAVITY 1.021 06/23/2019 1218   PHURINE 5.0 06/23/2019 1218   KETONES Negative 06/23/2019 1218   PROTEINURIN Negative 06/23/2019 1218   BILIRUBINUR Negative 06/23/2019 1218   UROBILU 0.2 06/23/2019 1218   NITRITE Positive (A) 06/23/2019 1218   LEUKESTERAS Large (A) 06/23/2019 1218   OCCULTBLOOD Large (A) 06/23/2019 1218     [facility]  No results found for: TOTPROTUR  No results found for: CREATININEU    Imaging reviewed  DX-CHEST-PORTABLE (1 VIEW)   Final Result      1.  Placement of right internal jugular catheter with tip projecting appropriately at the cavoatrial junction      2.  Placement of enteric catheter tip projecting of the proximal stomach      3.  No other changes         DX-CHEST-PORTABLE (1 VIEW)   Final Result      No acute cardiopulmonary abnormality identified.      OUTSIDE IMAGES-CT ABDOMEN /PELVIS   Final Result            Assessment/Plan  84 y.o. 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    1.  Hyperkalemia, likely due to acute kidney injury.  Patient had changes on her EKG.  Plan for dialysis urgently for hyperkalemia.  Of note, after the patient was already getting dialysis, she decided to pursue comfort care measures.  We will plan on no more dialysis after the session.    2.  Acute kidney injury.  It should be noted that the patient is very Sarcopenic, and a creatinine of 1.29 on admission likely reflect significant acute kidney injury or CKD.  Avoid nephrotoxins.  As the patient is transitioning to comfort care, allow pain medicines as needed for comfort measures.    3.  Hyponatremia, mild.  Present on admission.  Likely due to pain causing excess ADH.  As the patient is transitioning to comfort care, no need for repeat lab measurements or fluid restriction.    4.  Pneumoperitoneum, noted on CT imaging on admission.  Patient not a surgical candidate.  Furthermore, patient plans to pursue comfort care measures.    5.  Macrocytic anemia, noted on admission.  Likely anemia of inflammation.  As patient is pursuing comfort care measures, no need for further lab draws.    Patient is amenable to completing her session of dialysis today.  However, as the patient has decided  to pursue comfort care, nephrology will sign off after the completion of her dialysis.      Nelson Contreras MD  Nephrology

## 2019-06-23 NOTE — ED NOTES
from Lab called with critical result of potassium 7.5 at 0804. Critical lab result read back to .   Dr. Gaona notified of critical lab result at 0804.  Critical lab result read back by Dr. Gaona.

## 2019-06-23 NOTE — CONSULTS
"Consults       Critical care consult    I was asked by the emergency department physician to see him to see this patient with life-threatening hyperkalemia and abdominal pain.  Is an 84-year-old female with a history described in the chart of chronic abdominal pain small bowel obstructions failure to thrive who was transferred to [facility] for higher level of care and hyperkalemia.  Patient was seen by renal and a potassium of 6.5 and was started on dialysis with peak T waves a central line was placed by surgeon in the emergency department on my arrival to the emergency department.  On my exam I found the patient to be extremely cachectic awake alert appropriate but in distress and very unhappy.  I talked with her about her medical sojourn and her 20-year history of abdominal pain and multiple ED visits hospitalizations failure to thrive weight loss.  She estimates she is lost 10 pounds unintended in the past year.  He does appear to be \"skin and bones\" weighing about 70 pounds.  She lives by herself and incline I noted on exam her to be mildly tachycardic clearly dehydrated receiving dialysis.  Pressure was okay she is afebrile exam she her notable findings overall besides her severe failure to thrive/cachexia she had diffuse abdominal pain worse in the lower quadrants.  She has swollen puffy feet her neurologic exam is oriented x4/unremarkable her laboratory findings were notable for the increased creatinine of 1.6 and a potassium 6.4 I discussed medical decision-making in detail with the patient and the patient's daughter at the bedside.  Due to options continuing TPN care management correct electrolytes nutrition fluids laboratory evaluations additional consultants such as GI and surgery.  Patient with a support of her daughter after all this discussion and are titrated care elected for comfort care only and hospice.  She appreciates that she is at the end of her life and life has poor quality and she no " longer wants therapies with the aim to care or support her to keep going she wants to focus on comfort only.  She and her daughter appreciate that this may potentially accelerate her death but they wish for no further testing dialysis medicines that are not specifically for comfort only.  She seemed very firm on this discussion decision I talk with her in 3 separate occasions and she had came to the same conclusion all 3 times and her daughter said this is very consistent with the direction she has been moving in her thought process over the past several months.    Plan  Comfort measures only  DC all labs medicines are running for comfort measures only number next  Case management consult palliative care consult for home hospice or other facility with hospice.    Critical care 75 minutes-this was time spent evaluating and reevaluating her going over laboratories discussing care with consultants and then engaged in medical decision-making with the patient and her daughter.

## 2019-06-23 NOTE — OP REPORT
Vascular Surgery Procedure Note    Date of Service: 2019    Patient:  Alicia Diaz  :   1935  MRN:   [id number]      Preoperative Diagnosis:  -Hyperkalemia, need for hemodialysis access    Postoperative Diagnosis  -Hyperkalemia, need for hemodialysis access    Procedure  - Percutaneous access of the right internal jugular vein with ultrasound guidance  - Insertion of right internal jugular non-tunneled veno-venous dialysis catheter    Catheter used:  Kaitlin 12Fr Triple lumen dialysis catheter (16cm length)    Surgeon: Thaddeus Mann    Blood loss: minimal    Disposition: Patient tolerated procedure well    A time-out was completed verifying correct patient, procedure, site, positioning, and special equipment if applicable. The patient was prepped and draped in sterile fashion. 1% Lidocaine was used to anesthetize the surrounding skin area. The vein was accessed percutaneously and the J-wire passed up into the vein with ease.  A small puncture incision was made at the insertion site, the tract was dilated, then the catheter was passed over the wire into the vein. Appropriate blood return was obtained. Each lumen of the catheter was evacuated of air, flushed with sterile saline, and locked with concentrated heparin. The catheter was then sutured in place to the skin and a sterile dressing applied.   The patient tolerated the procedure well and there were no complications.    Thaddeus Mann MD  General and Vascular Surgery  [facility]  [phone]

## 2019-06-23 NOTE — PROGRESS NOTES
STAT HD ordered by Dr Contreras on 2k acid bath. Tx started at 1219 and ended at 1449. Tx tolerated well. No UF. Report given to LADAN Perez RN.

## 2019-06-23 NOTE — PROGRESS NOTES
Pt comfort care now, requesting all VS monitoring equipment be removed, including EKG leads. Also requesting Dialysis line to be removed, pt confirmed that she does not want any more dialysis treatments.

## 2019-06-23 NOTE — H&P
Hospital Medicine History & Physical Note    Date of Service  6/23/2019    Primary Care Physician  Pcp Not In Computer    Consultants  Nephrology consulted  Surgery consulted  Critical care consulted    Code Status  DNR/DNI    Chief Complaint  Abdominal pain    History of Presenting Illness  84 y.o. female who presented 6/23/2019 with abdominal pain which started yesterday. She denies any chest pain palpitation shortness of breath diaphoresis.  She denies any fever chills, cough or congestion or dysuria.  Patient has had a history of bowel obstruction in the past and she was worried that she was getting another one.  She apparently history of perforated gastric ulcer many years ago, history of uterine cancer with radical hysterectomy done, she also has history of radiation enterocolitis.  She has chronic diarrhea and has been on TPN for the past 4 to 6 weeks.  She was seen outside facility with CT scan abdomen and pelvis showed thickening of the small bowel, there were no signs of obstruction, however there was free air noted.  Her potassium was also noted to be 7.5.  She was then transferred over here, her repeat potassium here is 7.2, creatinine is noted to be 1.29, patient denies any history of kidney injury or failure in the past.  I discussed this case with ER physician, surgery, nephrology.  Patient may require emergent hemodialysis.  Hyperkalemia protocol will be started.  Critical care will also be consulted.  I spoke with the patient and her son at length, she is agreeable to dialysis if needed, CODE STATUS was also verified clarified, she is DNR/DNI.    Review of Systems  Review of Systems   Constitutional: Negative for chills, fever and malaise/fatigue.   HENT: Negative for hearing loss and sore throat.    Eyes: Negative for blurred vision.   Respiratory: Negative for cough, shortness of breath and wheezing.    Cardiovascular: Positive for leg swelling. Negative for chest pain.   Gastrointestinal:  Positive for abdominal pain and vomiting. Negative for blood in stool, diarrhea, heartburn, melena and nausea.   Genitourinary: Negative for dysuria.   Musculoskeletal: Negative for back pain and neck pain.   Skin: Negative for rash.   Neurological: Negative for dizziness, weakness and headaches.   Psychiatric/Behavioral: The patient is not nervous/anxious.        Past Medical History   has a past medical history of Cancer (HCC); Chronic pain; Dysphagia; Gastric ulcer with perforation (HCC); HTN (hypertension); Hypothyroid; Pancreatic insufficiency; Radiation enterocolitis; and SBO (small bowel obstruction) ([facility]).    Surgical History   has a past surgical history that includes hysterectomy radical and cholecystectomy.     Family History  family history includes Diabetes in her brother; Heart Failure in her mother.     Social History   reports that she has quit smoking. She does not have any smokeless tobacco history on file. She reports that she uses drugs, including Marijuana. She reports that she does not drink alcohol.    Allergies  No Known Allergies    Medications  Prior to Admission Medications   Prescriptions Last Dose Informant Patient Reported? Taking?   Buprenorphine HCl-Naloxone HCl (SUBOXONE) 2-0.5 MG FILM   Yes Yes   Sig: Take 0.125 Strips by mouth 1 time daily as needed.   HYDROcodone-acetaminophen (NORCO) 5-325 MG Tab per tablet   Yes Yes   Sig: Take 1 Tab by mouth every 6 hours as needed.   diphenoxylate-atropine (LOMOTIL) 2.5-0.025 MG Tab   Yes Yes   Sig: Take 0.5 tablet by mouth 4 times a day.   estrogens, conjugated (PREMARIN) 0.625 MG/GM Cream   Yes Yes   Sig: Insert 0.5 g in vagina.   levothyroxine (SYNTHROID) 100 MCG Tab   Yes Yes   Sig: Take 100 mcg by mouth every day.   loperamide (IMODIUM A-D) 2 MG tablet   Yes Yes   Sig: Take 0.5 mg by mouth 4 times a day as needed.   losartan (COZAAR) 25 MG Tab   Yes Yes   Sig: Take 25 mg by mouth every day.   pancrelipase, Lip-Prot-Amyl, (CREON)  37429-91168 units Cap DR Particles   Yes Yes   Sig: Take 1 Capsule by mouth 4 times a day. With food   spironolactone (ALDACTONE) 50 MG Tab   Yes Yes   Sig: Take 50 mg by mouth every day.      Facility-Administered Medications: None       Physical Exam  Temp:  [37.5 °C (99.5 °F)] 37.5 °C (99.5 °F)  Pulse:  [] 106  Resp:  [13-29] 15  BP: (141)/(45) 141/45  SpO2:  [93 %-99 %] 96 %    Physical Exam   Constitutional: She is oriented to person, place, and time. She appears well-developed.   HENT:   Head: Normocephalic and atraumatic.   Eyes: Pupils are equal, round, and reactive to light. Conjunctivae are normal.   Neck: No tracheal deviation present. No thyromegaly present.   Cardiovascular: Regular rhythm.  Tachycardia present.    Pulmonary/Chest: Effort normal and breath sounds normal.   Abdominal: She exhibits distension. There is tenderness. There is no rebound and no guarding.   Musculoskeletal: She exhibits edema.   Lymphadenopathy:     She has no cervical adenopathy.   Neurological: She is alert and oriented to person, place, and time.   Skin: Skin is warm and dry.   Nursing note and vitals reviewed.      Laboratory:  Recent Labs      06/23/19   0713   WBC  9.6   RBC  3.77*   HEMOGLOBIN  11.9*   HEMATOCRIT  37.7   MCV  100.0*   MCH  31.6   MCHC  31.6*   RDW  59.0*   PLATELETCT  420   MPV  9.9     Recent Labs      06/23/19   0713  06/23/19   0944   SODIUM  133*   --    POTASSIUM  7.5*  6.4*   CHLORIDE  103   --    CO2  20   --    GLUCOSE  97   --    BUN  60*   --    CREATININE  1.29   --    CALCIUM  9.3   --      Recent Labs      06/23/19   0713   ALTSGPT  29   ASTSGOT  18   ALKPHOSPHAT  131*   TBILIRUBIN  0.4   LIPASE  75   GLUCOSE  97     Recent Labs      06/23/19   0944   APTT  38.7*   INR  1.19*             No results for input(s): TROPONINI in the last 72 hours.    Urinalysis:    No results found     Imaging:  DX-CHEST-PORTABLE (1 VIEW)   Final Result      1.  Placement of right internal jugular  catheter with tip projecting appropriately at the cavoatrial junction      2.  Placement of enteric catheter tip projecting of the proximal stomach      3.  No other changes         DX-CHEST-PORTABLE (1 VIEW)   Final Result      No acute cardiopulmonary abnormality identified.      OUTSIDE IMAGES-CT ABDOMEN /PELVIS   Final Result            Assessment/Plan:  I anticipate this patient will require at least two midnights for appropriate medical management, necessitating inpatient admission.    Pneumoperitoneum- (present on admission)   Assessment & Plan    Keep NPO  Pain control  Surgery consulted     Hyperkalemia- (present on admission)   Assessment & Plan    D50 + Insulin  IV Ca  IV NaHCO3  Kayexalate  May need emergent Hemodialysis  Serial serum K     DARI (acute kidney injury) (HCC)- (present on admission)   Assessment & Plan    IVF hydration  Nephrology consulted  Check cpk     Elevated lactic acid level- (present on admission)   Assessment & Plan    IVF hydration  Serial lactic     Chronic pain- (present on admission)   Assessment & Plan    pain control     On total parenteral nutrition (TPN)- (present on admission)   Assessment & Plan    Hold for now     Radiation enterocolitis- (present on admission)   Assessment & Plan    Keep NPO  IVF hydration     Hypothyroid- (present on admission)   Assessment & Plan    Check TSH     Essential hypertension- (present on admission)   Assessment & Plan    IV labetalol prn     Macrocytosis- (present on admission)   Assessment & Plan    Check b12, folate         VTE prophylaxis: Heparin    Critically ill with severe hyperkalemia, acute kidney injury, pneumoperitoneum, elevated lactic acid, history of radiation to colitis, discussed case with surgery, nephrology, admit to ICU, critical care time is 55 minutes.

## 2019-06-23 NOTE — ED TRIAGE NOTES
Pt BIB EMS as transfer from [facility]. K 7.2, c/o of abdominal pain starting last night with N/V.  Abd CT from previous facility showed free air. Pt with R upper arm PICC. Previously receiving TPN in purple port d/c today by OSH.

## 2019-06-23 NOTE — ED NOTES
Unable to complete Med Rec  Pt unable to participate in interview  Will attempt to call Pt's pharmacy when open

## 2019-06-23 NOTE — CONSULTS
General Surgery Consult    Date of Service: 6/23/2019    Consulting Physician: Thaddeus Mann M.D. [facility]    -------------------------------------------------------------------------------------------------    Chief complaint:   1) hyperkalemia  2) pneumoperitoneum    HPI: This is a 84 y.o. female who was transferred in with abdominal pain and CT scan showed pneumoperitoneum.  Patient is quite cachectic and is TPN dependent.  She has been on and off TPN several times over the years due to a chronic history of enterocolitis.  Strangely she actually had a bowel perforation which was treated nonoperatively in [place] 4 years ago and she got better.  She also has a history of uterine cancer and radiation treatment.  Also when she arrived in the ER today she was found to be severely hyperkalemic with a potassium of 7.  I have been consulted for the bowel perforation and the hyperkalemia to place a temporary dialysis catheter.  When I came to see the patient she was totally alert and conversant.  She was reporting mild abdominal pain.  An NG tube has been placed and is not putting out very much fluid.  Patient reports that her primary care doctor is in [place], and she has mentioned many times during my conversation that if she needs surgery she may consider being transferred there, which is why I do not understand why she let them transfer her here from [place].      Past Medical History:   Diagnosis Date   • Cancer (HCC)     Uterina CA w/ hysterectomy + radiation    • Chronic pain    • Dysphagia    • Gastric ulcer with perforation (HCC)    • HTN (hypertension)    • Hypothyroid    • Pancreatic insufficiency    • Radiation enterocolitis    • SBO (small bowel obstruction) (HCC)        Past Surgical History:   Procedure Laterality Date   • CHOLECYSTECTOMY     • HYSTERECTOMY RADICAL         Current Facility-Administered Medications   Medication Dose Route Frequency Provider Last Rate Last Dose   • MD  ALERT...adult comfort care   Other PRN Hernán Tsai M.D.       • atropine 1 % ophthalmic solution 2 Drop  2 Drop Sublingual Q4HRS PRN Hernán Tsai M.D.       • morphine (pf) 10 mg/mL injection 5 mg  5 mg Intravenous Q HOUR PRN Hernán Tsai M.D.   5 mg at 06/23/19 1554   • morphine (pf) 10 mg/mL injection 10 mg  10 mg Intravenous Q HOUR PRN Hernán Tsai M.D.       • ondansetron (ZOFRAN ODT) dispertab 8 mg  8 mg Oral Q8HRS PRN Hernán Tsai M.D.        Or   • ondansetron (ZOFRAN) syringe/vial injection 8 mg  8 mg Intravenous Q8HRS PRN Hernán Tsai M.D.       • LORazepam (ATIVAN) 2 MG/ML oral conc 1 mg  1 mg Sublingual Q HOUR PRN Hernán Tsai M.D.        Or   • LORazepam (ATIVAN) injection 1 mg  1 mg Intravenous Q HOUR PRN Hernán Tsai M.D.       • ciprofloxacin (CIPRO) tablet 250 mg  250 mg Oral Q18HRS Hernán Tsai M.D.           Social History     Social History   • Marital status:      Spouse name: N/A   • Number of children: N/A   • Years of education: N/A     Occupational History   • Not on file.     Social History Main Topics   • Smoking status: Former Smoker   • Smokeless tobacco: Not on file   • Alcohol use No   • Drug use: Yes     Types: Marijuana   • Sexual activity: Not on file     Other Topics Concern   • Not on file     Social History Narrative   • No narrative on file       Family History   Problem Relation Age of Onset   • Heart Failure Mother    • Diabetes Brother        Allergies:  Patient has no known allergies.    Review of Systems:  Constitutional: Negative for fever, chills, weight loss,   HENT:   Negative for hearing loss or tinnitus    Eyes:    Negative for blurred vision, double vision, or loss of vision  Respiratory:  Negative for cough, hemoptysis, or wheezing    Cardiac:  Negative for chest pain or palpitations or orthopnea  Vascular:  Negative for claudication or rest pain   Gastrointestinal: As per HPI   Genitourinary: Negative for dysuria,  frequency, or hematuria   Musculoskeletal: Negative for myalgias, back pain, or joint pain  Skin:   Negative for itching or rash  Neurological:  Negative for dizziness, headaches, or tremors     Negative for speech disturbance     Negative for extremity weakness or paresthesias  Endo/Heme:  Negative for easy bruising or bleeding  Psychiatric:  Negative for depression, suicidal ideas, or hallucinations    Physical Exam:  Temperature 99.5  heart rate 106  blood pressure 129/73  respiratory rate of 16  Oxygen saturation 95% on room air    Constitutional: Alert, oriented, mild distress     Emaciated, cachectic  HEENT:  Normocephalic and atraumatic, EOMI  Neck:   Supple, no JVD,   Cardiovascular: Regular rate and rhythm,   Pulmonary:  Good air entry bilaterally,   Abdominal:  Soft, mildly distended     Mildly tender to palpation diffusely     No rebound/guarding  Musculoskeletal: No edema, no tenderness  Neurological:  CN II-XII grossly intact, no focal deficits  Skin:   Skin is warm and dry. No rash noted.  Psychiatric:  Normal mood and affect.    Labs:  Recent Labs      06/23/19 0713   WBC  9.6   RBC  3.77*   HEMOGLOBIN  11.9*   HEMATOCRIT  37.7   MCV  100.0*   MCH  31.6   MCHC  31.6*   RDW  59.0*   PLATELETCT  420   MPV  9.9     Recent Labs      06/23/19   0713  06/23/19   0944   SODIUM  133*   --    POTASSIUM  7.5*  6.4*   CHLORIDE  103   --    CO2  20   --    GLUCOSE  97   --    BUN  60*   --    CREATININE  1.29   --    CALCIUM  9.3   --      Recent Labs      06/23/19   0944   APTT  38.7*   INR  1.19*     Recent Labs      06/23/19   0713  06/23/19   0944   ASTSGOT  18   --    ALTSGPT  29   --    TBILIRUBIN  0.4   --    ALKPHOSPHAT  131*   --    GLOBULIN  2.9   --    INR   --   1.19*       Radiology:  CT scan shows mild bowel wall thickening and very small pockets of pneumoperitoneum suggestive of perforation, site unknown    Assessment:  -Pneumoperitoneum  -Hyperkalemia  -Failure to thrive  -Protein calorie  malnutrition  -History of uterine cancer  -Hypertension  -Hypothyroidism  -Radiation enterocolitis    This is a 84 y.o. female with pneumoperitoneum and hyperkalemia.  She is currently not septic.    Plan:  After discussing everything with the patient we will start by putting in a dialysis catheter, temporary dialysis catheter, to treat her hyperkalemia.  We will also use an NG tube, n.p.o., TPN as initial nonoperative management for her bowel perforation.  She actually healed a bowel perforation using this modality previously so perhaps that will occur again for us as the perforation does not appear that bad on CT scan.  We will follow her clinically and determine if surgical intervention for the abdomen will become necessary.    Thaddeus Mann M.D.  [facility]  [phone]  Cell: [phone]

## 2019-06-23 NOTE — ED NOTES
NG placed, pt tolerated well. Low suction applied. Green output noted. Medication infusing, see MAR.

## 2019-06-24 VITALS
SYSTOLIC BLOOD PRESSURE: 135 MMHG | HEART RATE: 108 BPM | WEIGHT: 92 LBS | OXYGEN SATURATION: 96 % | HEIGHT: 59 IN | BODY MASS INDEX: 18.55 KG/M2 | DIASTOLIC BLOOD PRESSURE: 51 MMHG | TEMPERATURE: 98.4 F | RESPIRATION RATE: 16 BRPM

## 2019-06-24 PROCEDURE — 700102 HCHG RX REV CODE 250 W/ 637 OVERRIDE(OP): Performed by: INTERNAL MEDICINE

## 2019-06-24 PROCEDURE — A9270 NON-COVERED ITEM OR SERVICE: HCPCS | Performed by: INTERNAL MEDICINE

## 2019-06-24 PROCEDURE — 99239 HOSP IP/OBS DSCHRG MGMT >30: CPT | Performed by: INTERNAL MEDICINE

## 2019-06-24 RX ORDER — MORPHINE SULFATE 100 MG/5ML
10-20 SOLUTION ORAL
Status: DISCONTINUED | OUTPATIENT
Start: 2019-06-24 | End: 2019-06-24 | Stop reason: HOSPADM

## 2019-06-24 RX ORDER — ONDANSETRON 4 MG/1
4 TABLET, ORALLY DISINTEGRATING ORAL EVERY 8 HOURS PRN
COMMUNITY

## 2019-06-24 RX ORDER — HYDROCODONE BITARTRATE AND ACETAMINOPHEN 5; 325 MG/1; MG/1
1-2 TABLET ORAL EVERY 4 HOURS PRN
Status: DISCONTINUED | OUTPATIENT
Start: 2019-06-24 | End: 2019-06-24 | Stop reason: HOSPADM

## 2019-06-24 RX ORDER — RANITIDINE 150 MG/1
150 TABLET ORAL 2 TIMES DAILY
COMMUNITY

## 2019-06-24 RX ORDER — IRBESARTAN 150 MG/1
75 TABLET ORAL DAILY
COMMUNITY

## 2019-06-24 RX ADMIN — HYDROCODONE BITARTRATE AND ACETAMINOPHEN 1 TABLET: 5; 325 TABLET ORAL at 08:57

## 2019-06-24 RX ADMIN — CIPROFLOXACIN HYDROCHLORIDE 250 MG: 500 TABLET, FILM COATED ORAL at 08:58

## 2019-06-24 NOTE — DISCHARGE PLANNING
Medical Social Work    LSW met with pt to discuss hospice. Pt has already discussed hospice with per PCP, who is affiliated with [facility]. Pt states she plans to arrange this through her PCP following discharge from hospital.

## 2019-06-24 NOTE — PROGRESS NOTES
Patient alert and oriented. Patient stated this am she wants to go home. Informed DrRadha Smart. New orders received. Norco given for pain control this am. Patient requested to go home with her PICC line. Family at bedside at this time.

## 2019-06-24 NOTE — PROGRESS NOTES
3rd call made in attempt to give oncology RN report. Extension given, Arun said she will call back.

## 2019-06-24 NOTE — PROGRESS NOTES
Dr. Tasi at bedside, pt has decided to go comfort care but wishes to finish this one dialysis treatment. New orders for comfort care received from Dr. Tsai

## 2019-06-24 NOTE — PROGRESS NOTES
Upon arrival to oncology floor pt's family requested IV fluids and TPN for the pt. Family has been re-educated on comfort care and that these are not standard for comfort care patients.     Care handed over to oncology RN.

## 2019-06-24 NOTE — CARE PLAN
Problem: Pain Management  Goal: Pain level will decrease to patient's comfort goal  Outcome: PROGRESSING AS EXPECTED  Assessed patient's pain and pain goal. Medication administered per MAR. Encouraged pillows for support and encouraged repositioning.     Problem: Safety  Goal: Will remain free from falls  Outcome: PROGRESSING AS EXPECTED  Oriented patient to room and use of call light. Call light and personal belongings within reach. Bed in lowest, locked position.

## 2019-06-24 NOTE — CARE PLAN
Problem: Pain Management  Goal: Pain level will decrease to patient's comfort goal    Intervention: Follow pain managment plan developed in collaboration with patient and Interdisciplinary Team  Pain medication given as needed. Patient states pain inprovenment      Problem: Safety  Goal: Will remain free from falls    Intervention: Assess risk factors for falls  Bed alarm on for patient safety

## 2019-06-24 NOTE — PALLIATIVE CARE
Palliative Care follow-up  Received consult order. Pt on Comfort Care. Updated MD, House SW updated to speak with family to provide Hospice choice. Palliative consult cancelled.     Thank you for allowing Palliative Care to participate in this patient's care. Please feel free to call [phone] with any questions or concerns.

## 2019-06-24 NOTE — DISCHARGE SUMMARY
Discharge Summary    CHIEF COMPLAINT ON ADMISSION  Chief Complaint   Patient presents with   • Abnormal Labs   • Abdominal Pain       Reason for Admission  ems     Admission Date  6/23/2019    CODE STATUS  Comfort Care/DNR    HPI & HOSPITAL COURSE  This is a 84 y.o. female here with abdominal pain with history of uterine cancer, post radical hysterectomy and complicated by radiation enterocolitis and chronic diarrhea.  She had been supplemented on TPN for past few weeks.  CT scan from OSH showed thickening of the small bowel without evidence of obstruction but free air was noted.  Potassium was 7.5 and after transfer to [facility] was 7.2.   Patient admitted to the ICU and was seen by surgery and had temporary HD catheter placed, nephrology and received emergent dialysis and critical care.  When she was in ICU receiving HD, she agreed that she only wanted comfort measures and to finish the HD and return home with hospice.  She was sent to the oncology floor on comfort care measures.  On my evaluation, she told me she was going back home, would have Dr Waddell arrange home health for her again and resume TPN like this episode had never occurred.  Family and patient tell me they have been in contact with Dr Waddell and my  confirmed that she spoke with Dr Waddell and home health would be arranged.  Patient requested to be discharged home, daughter at bedside stated she would take her home.         Therefore, she is discharged in good and stable condition to home with organized home healthcare and close outpatient follow-up.    The patient met 2-midnight criteria for an inpatient stay at the time of discharge.    Discharge Date  6/24/2019    FOLLOW UP ITEMS POST DISCHARGE  Dr Waddell    DISCHARGE DIAGNOSES  Active Problems:    DARI (acute kidney injury) (HCC) POA: Yes    Hyperkalemia POA: Yes    Pneumoperitoneum POA: Yes    Essential hypertension POA: Yes    Hypothyroid POA: Yes    Radiation enterocolitis POA: Yes    On total  parenteral nutrition (TPN) POA: Yes    Chronic pain POA: Yes    Elevated lactic acid level POA: Yes    Macrocytosis POA: Yes  Resolved Problems:    * No resolved hospital problems. *      FOLLOW UP  No future appointments.  Olinda Waddell M.D.  [id number]  [facility] [address]  [phone]    In 1 day        MEDICATIONS ON DISCHARGE     Medication List      CONTINUE taking these medications      Instructions   CREON 18876-87815 units Cpep  Generic drug:  pancrelipase (Lip-Prot-Amyl)   Take 2-4 Capsule by mouth See Admin Instructions. 3-4 caps with meals  2 caps with snacks  Dose:  2-4 Capsule     HYDROcodone-acetaminophen 5-325 MG Tabs per tablet  Commonly known as:  NORCO   Take 1 Tab by mouth every 6 hours as needed.  Dose:  1 Tab     irbesartan 150 MG Tabs  Commonly known as:  AVAPRO   Take 75 mg by mouth every day.  Dose:  75 mg     levothyroxine 100 MCG Tabs  Commonly known as:  SYNTHROID   Take 100 mcg by mouth every day.  Dose:  100 mcg     LOMOTIL 2.5-0.025 MG Tabs  Generic drug:  diphenoxylate-atropine   Take 0.5 tablet by mouth 4 times a day as needed.  Dose:  0.5 tablet     metoprolol 25 MG Tabs  Commonly known as:  LOPRESSOR   Take 25 mg by mouth 2 times a day.  Dose:  25 mg     ondansetron 4 MG Tbdp  Commonly known as:  ZOFRAN ODT   Take 4 mg by mouth every 8 hours as needed for Nausea.  Dose:  4 mg     raNITidine 150 MG Tabs  Commonly known as:  ZANTAC   Take 150 mg by mouth 2 times a day.  Dose:  150 mg     spironolactone 25 MG Tabs  Commonly known as:  ALDACTONE   Take 25 mg by mouth every day.  Dose:  25 mg     SUBOXONE 2-0.5 MG Film  Generic drug:  Buprenorphine HCl-Naloxone HCl   Take 1 Film by mouth 1 time daily as needed.  Dose:  1 Film            Allergies  No Known Allergies    DIET  Orders Placed This Encounter   Procedures   • Diet Order Regular     Standing Status:   Standing     Number of Occurrences:   1     Order Specific Question:   Diet:     Answer:   Regular [1]        ACTIVITY  As tolerated.  Weight bearing as tolerated    CONSULTATIONS  Surgery - Thaddeus Mann  Critical care - Hernán Tsai  Nephrology - Nelson Contreras    PROCEDURES  6/23/19 - non-tunneled dialysis catheter     LABORATORY  Lab Results   Component Value Date    SODIUM 133 (L) 06/23/2019    POTASSIUM 6.4 (H) 06/23/2019    CHLORIDE 103 06/23/2019    CO2 20 06/23/2019    GLUCOSE 97 06/23/2019    BUN 60 (H) 06/23/2019    CREATININE 1.29 06/23/2019        Lab Results   Component Value Date    WBC 9.6 06/23/2019    HEMOGLOBIN 11.9 (L) 06/23/2019    HEMATOCRIT 37.7 06/23/2019    PLATELETCT 420 06/23/2019        Total time of the discharge process exceeds 33 minutes.

## 2019-06-24 NOTE — PROGRESS NOTES
Report received from ED RN.     Patient transported to RICU via bed on monitor with ACLS RN and CCT.     Pt/Family confirmed that all belongings are present.     VSS.     Pt/Family oriented to ICU and plan of care.     Doctor notified of pt's arrival.    Elyssa RN at bedside to hook pt up for dialysis.

## 2019-06-24 NOTE — PROGRESS NOTES
Report given to oncology RN.  currently at bedside, Will transfer pt up to [id number] when family is done talking with the doctor.

## 2019-06-24 NOTE — PROGRESS NOTES
Patient discharge to home with family. Reviewed home instructions and home medication with family. Patient was on comfort care measures only at this time. Patient will follow up with her PCP.

## 2019-06-24 NOTE — PROGRESS NOTES
DATE OF SERVICE:  06/23/2019    SUBJECTIVE:  I came to [facility] ICU to do a progress check on the patient and   a repeat physical exam to determine her progress with her pneumoperitoneum and   also hyperkalemia.  The patient is awake, alert and conversing with her   daughter in the room.  The patient is reporting that she overall feels better   than she did this morning when she came to the hospital in the first place.    They have completed a dialysis treatment and removed the dialysis catheter.    Per my discussion with the patient and her daughter and also reviewing the   notes in the chart, the patient has decided on nonoperative management for her   bowel obstruction and in fact would like to go home and continue bowel rest   with TPN at home, which is acceptable given the fact that she has already   established a TPN regimen at home prior to even coming to the hospital.  I   discussed all of our findings and her overall clinical situation with her and   the daughter in great detail and given her overall severely frail medical   state and high likelihood of complications, and even potentially dying with   any abdominal surgical intervention, I agree that a nonoperative route is in   the patient's best interest.  I feel like the patient and her daughter have a   very good understanding of the situation and the consequences of leaving the   hospital with a bowel perforation knowing that she could get septic and she   could potentially die from the bowel perforation; however, her surgical   options carry a high likelihood of complication and mortality, and therefore   decided to forego surgery is a reasonable option in her current situation.  I   have given them my contact information and asked him to call me if any   questions arise.  At this point, the patient is going to be discharged home on   hospice with TPN.  I am available for any questions or concerns that arise   along the way.        ____________________________________     MD MODESTO Meléndez / DALY    DD:  06/23/2019 17:46:53  DT:  06/23/2019 18:41:47    D#:  [id number]  Job#:  [id number]

## 2019-06-25 LAB
BACTERIA UR CULT: ABNORMAL
BACTERIA UR CULT: ABNORMAL
SIGNIFICANT IND 70042: ABNORMAL
SITE SITE: ABNORMAL
SOURCE SOURCE: ABNORMAL

## 2019-06-25 NOTE — DISCHARGE PLANNING
REcieved TC from Alicia @ [facility] regarding patient. She was on their service they requested DC summary and AVS Faxed this to them @[phone].

## 2021-01-15 DIAGNOSIS — Z23 NEED FOR VACCINATION: ICD-10-CM

## 2022-03-08 NOTE — DISCHARGE INSTRUCTIONS
Discharge Instructions    Discharged to home by car with relative. Discharged via wheelchair, hospital escort: Yes.  Special equipment needed: Not Applicable    Be sure to schedule a follow-up appointment with your primary care doctor or any specialists as instructed.     Discharge Plan:   Diet Plan: Discussed  Activity Level: Discussed  Confirmed Follow up Appointment: Appointment Scheduled  Confirmed Symptoms Management: Discussed  Medication Reconciliation Updated: Yes  Influenza Vaccine Indication: Not indicated: Previously immunized this influenza season and > 8 years of age    I understand that a diet low in cholesterol, fat, and sodium is recommended for good health. Unless I have been given specific instructions below for another diet, I accept this instruction as my diet prescription.   Other diet: regular    Special Instructions: None    · Is patient discharged on Warfarin / Coumadin?   No     Depression / Suicide Risk    As you are discharged from this [facility] facility, it is important to learn how to keep safe from harming yourself.    Recognize the warning signs:  · Abrupt changes in personality, positive or negative- including increase in energy   · Giving away possessions  · Change in eating patterns- significant weight changes-  positive or negative  · Change in sleeping patterns- unable to sleep or sleeping all the time   · Unwillingness or inability to communicate  · Depression  · Unusual sadness, discouragement and loneliness  · Talk of wanting to die  · Neglect of personal appearance   · Rebelliousness- reckless behavior  · Withdrawal from people/activities they love  · Confusion- inability to concentrate     If you or a loved one observes any of these behaviors or has concerns about self-harm, here's what you can do:  · Talk about it- your feelings and reasons for harming yourself  · Remove any means that you might use to hurt yourself (examples: pills, rope, extension cords,  firearm)  · Get professional help from the community (Mental Health, Substance Abuse, psychological counseling)  · Do not be alone:Call your Safe Contact- someone whom you trust who will be there for you.  · Call your local CRISIS HOTLINE 174-7823 or 249-157-5059  · Call your local Children's Mobile Crisis Response Team [place] (738) 727-5864 or www.SoCAT  · Call the toll free National Suicide Prevention Hotlines   · National Suicide Prevention Lifeline 762-083-BNFU (5484)  · National Hope Line Network 800-SUICIDE (256-4524)         I have personally seen and examined this patient.  I have fully participated in the care of this patient. I have reviewed all pertinent clinical information, including history, physical exam, plan and the Resident’s note and agree except as noted.